# Patient Record
Sex: FEMALE | Race: WHITE | Employment: OTHER | URBAN - METROPOLITAN AREA
[De-identification: names, ages, dates, MRNs, and addresses within clinical notes are randomized per-mention and may not be internally consistent; named-entity substitution may affect disease eponyms.]

---

## 2019-05-03 ENCOUNTER — HOSPITAL ENCOUNTER (EMERGENCY)
Age: 44
Discharge: HOME | End: 2019-05-03
Attending: EMERGENCY MEDICINE

## 2019-05-03 ENCOUNTER — APPOINTMENT (OUTPATIENT)
Dept: GENERAL RADIOLOGY | Age: 44
End: 2019-05-03

## 2019-05-03 VITALS
HEART RATE: 73 BPM | HEIGHT: 67 IN | DIASTOLIC BLOOD PRESSURE: 81 MMHG | OXYGEN SATURATION: 100 % | WEIGHT: 135 LBS | SYSTOLIC BLOOD PRESSURE: 138 MMHG | BODY MASS INDEX: 21.19 KG/M2 | TEMPERATURE: 98.1 F | RESPIRATION RATE: 18 BRPM

## 2019-05-03 DIAGNOSIS — R21 RASH AND OTHER NONSPECIFIC SKIN ERUPTION: Primary | ICD-10-CM

## 2019-05-03 LAB
A/G RATIO: 1.5 (ref 1.1–2.2)
ALBUMIN SERPL-MCNC: 4.4 G/DL (ref 3.4–5)
ALP BLD-CCNC: 45 U/L (ref 40–129)
ALT SERPL-CCNC: 10 U/L (ref 10–40)
ANION GAP SERPL CALCULATED.3IONS-SCNC: 14 MMOL/L (ref 3–16)
AST SERPL-CCNC: 11 U/L (ref 15–37)
BASOPHILS ABSOLUTE: 0 K/UL (ref 0–0.2)
BASOPHILS RELATIVE PERCENT: 0.3 %
BILIRUB SERPL-MCNC: 0.7 MG/DL (ref 0–1)
BUN BLDV-MCNC: 15 MG/DL (ref 7–20)
CALCIUM SERPL-MCNC: 8.9 MG/DL (ref 8.3–10.6)
CHLORIDE BLD-SCNC: 103 MMOL/L (ref 99–110)
CO2: 23 MMOL/L (ref 21–32)
CREAT SERPL-MCNC: 0.6 MG/DL (ref 0.6–1.1)
EKG ATRIAL RATE: 69 BPM
EKG DIAGNOSIS: NORMAL
EKG P AXIS: 27 DEGREES
EKG P-R INTERVAL: 142 MS
EKG Q-T INTERVAL: 390 MS
EKG QRS DURATION: 98 MS
EKG QTC CALCULATION (BAZETT): 417 MS
EKG R AXIS: 45 DEGREES
EKG T AXIS: 51 DEGREES
EKG VENTRICULAR RATE: 69 BPM
EOSINOPHILS ABSOLUTE: 0.1 K/UL (ref 0–0.6)
EOSINOPHILS RELATIVE PERCENT: 1.2 %
GFR AFRICAN AMERICAN: >60
GFR NON-AFRICAN AMERICAN: >60
GLOBULIN: 2.9 G/DL
GLUCOSE BLD-MCNC: 102 MG/DL (ref 70–99)
HCT VFR BLD CALC: 42.3 % (ref 36–48)
HEMOGLOBIN: 14.7 G/DL (ref 12–16)
LYMPHOCYTES ABSOLUTE: 3 K/UL (ref 1–5.1)
LYMPHOCYTES RELATIVE PERCENT: 26.4 %
MCH RBC QN AUTO: 31.5 PG (ref 26–34)
MCHC RBC AUTO-ENTMCNC: 34.8 G/DL (ref 31–36)
MCV RBC AUTO: 90.4 FL (ref 80–100)
MONOCYTES ABSOLUTE: 0.6 K/UL (ref 0–1.3)
MONOCYTES RELATIVE PERCENT: 5.6 %
NEUTROPHILS ABSOLUTE: 7.7 K/UL (ref 1.7–7.7)
NEUTROPHILS RELATIVE PERCENT: 66.5 %
PDW BLD-RTO: 12.9 % (ref 12.4–15.4)
PLATELET # BLD: 117 K/UL (ref 135–450)
PMV BLD AUTO: 12.1 FL (ref 5–10.5)
POTASSIUM SERPL-SCNC: 3.5 MMOL/L (ref 3.5–5.1)
RBC # BLD: 4.68 M/UL (ref 4–5.2)
SODIUM BLD-SCNC: 140 MMOL/L (ref 136–145)
TOTAL PROTEIN: 7.3 G/DL (ref 6.4–8.2)
TROPONIN: <0.01 NG/ML
WBC # BLD: 11.5 K/UL (ref 4–11)

## 2019-05-03 PROCEDURE — 96374 THER/PROPH/DIAG INJ IV PUSH: CPT

## 2019-05-03 PROCEDURE — 85025 COMPLETE CBC W/AUTO DIFF WBC: CPT

## 2019-05-03 PROCEDURE — 6360000002 HC RX W HCPCS: Performed by: EMERGENCY MEDICINE

## 2019-05-03 PROCEDURE — 99285 EMERGENCY DEPT VISIT HI MDM: CPT

## 2019-05-03 PROCEDURE — 93010 ELECTROCARDIOGRAM REPORT: CPT | Performed by: INTERNAL MEDICINE

## 2019-05-03 PROCEDURE — 94640 AIRWAY INHALATION TREATMENT: CPT

## 2019-05-03 PROCEDURE — 71046 X-RAY EXAM CHEST 2 VIEWS: CPT

## 2019-05-03 PROCEDURE — 6370000000 HC RX 637 (ALT 250 FOR IP): Performed by: EMERGENCY MEDICINE

## 2019-05-03 PROCEDURE — 84484 ASSAY OF TROPONIN QUANT: CPT

## 2019-05-03 PROCEDURE — 93005 ELECTROCARDIOGRAM TRACING: CPT | Performed by: EMERGENCY MEDICINE

## 2019-05-03 PROCEDURE — 96375 TX/PRO/DX INJ NEW DRUG ADDON: CPT

## 2019-05-03 PROCEDURE — 2500000003 HC RX 250 WO HCPCS: Performed by: EMERGENCY MEDICINE

## 2019-05-03 PROCEDURE — 80053 COMPREHEN METABOLIC PANEL: CPT

## 2019-05-03 RX ORDER — DIPHENHYDRAMINE HYDROCHLORIDE 50 MG/ML
25 INJECTION INTRAMUSCULAR; INTRAVENOUS ONCE
Status: COMPLETED | OUTPATIENT
Start: 2019-05-03 | End: 2019-05-03

## 2019-05-03 RX ORDER — IPRATROPIUM BROMIDE AND ALBUTEROL SULFATE 2.5; .5 MG/3ML; MG/3ML
2 SOLUTION RESPIRATORY (INHALATION) ONCE
Status: COMPLETED | OUTPATIENT
Start: 2019-05-03 | End: 2019-05-03

## 2019-05-03 RX ORDER — PREDNISONE 10 MG/1
50 TABLET ORAL DAILY
Qty: 25 TABLET | Refills: 0 | Status: SHIPPED | OUTPATIENT
Start: 2019-05-03 | End: 2019-05-08

## 2019-05-03 RX ORDER — DEXAMETHASONE SODIUM PHOSPHATE 4 MG/ML
8 INJECTION, SOLUTION INTRA-ARTICULAR; INTRALESIONAL; INTRAMUSCULAR; INTRAVENOUS; SOFT TISSUE ONCE
Status: COMPLETED | OUTPATIENT
Start: 2019-05-03 | End: 2019-05-03

## 2019-05-03 RX ADMIN — FAMOTIDINE 20 MG: 10 INJECTION, SOLUTION INTRAVENOUS at 03:13

## 2019-05-03 RX ADMIN — IPRATROPIUM BROMIDE AND ALBUTEROL SULFATE 2 AMPULE: .5; 3 SOLUTION RESPIRATORY (INHALATION) at 02:15

## 2019-05-03 RX ADMIN — DIPHENHYDRAMINE HYDROCHLORIDE 25 MG: 50 INJECTION, SOLUTION INTRAMUSCULAR; INTRAVENOUS at 03:13

## 2019-05-03 RX ADMIN — DEXAMETHASONE SODIUM PHOSPHATE 8 MG: 4 INJECTION, SOLUTION INTRAMUSCULAR; INTRAVENOUS at 03:12

## 2019-05-05 ENCOUNTER — HOSPITAL ENCOUNTER (EMERGENCY)
Age: 44
Discharge: HOME | End: 2019-05-05

## 2019-05-05 VITALS
OXYGEN SATURATION: 100 % | SYSTOLIC BLOOD PRESSURE: 130 MMHG | WEIGHT: 130 LBS | RESPIRATION RATE: 18 BRPM | BODY MASS INDEX: 20.4 KG/M2 | HEART RATE: 68 BPM | HEIGHT: 67 IN | DIASTOLIC BLOOD PRESSURE: 87 MMHG | TEMPERATURE: 98.5 F

## 2019-05-05 DIAGNOSIS — F41.1 ANXIETY STATE: Primary | ICD-10-CM

## 2019-05-05 DIAGNOSIS — F15.93 WITHDRAWAL FROM OTHER STIMULANT DRUG (HCC): ICD-10-CM

## 2019-05-05 LAB
A/G RATIO: 1.6 (ref 1.1–2.2)
ALBUMIN SERPL-MCNC: 4.5 G/DL (ref 3.4–5)
ALP BLD-CCNC: 44 U/L (ref 40–129)
ALT SERPL-CCNC: 10 U/L (ref 10–40)
AMORPHOUS: ABNORMAL /HPF
AMPHETAMINE SCREEN, URINE: ABNORMAL
ANION GAP SERPL CALCULATED.3IONS-SCNC: 12 MMOL/L (ref 3–16)
AST SERPL-CCNC: 9 U/L (ref 15–37)
BARBITURATE SCREEN URINE: ABNORMAL
BASOPHILS ABSOLUTE: 0.1 K/UL (ref 0–0.2)
BASOPHILS RELATIVE PERCENT: 0.4 %
BENZODIAZEPINE SCREEN, URINE: POSITIVE
BILIRUB SERPL-MCNC: 0.6 MG/DL (ref 0–1)
BILIRUBIN URINE: ABNORMAL
BLOOD, URINE: ABNORMAL
BUN BLDV-MCNC: 18 MG/DL (ref 7–20)
CALCIUM SERPL-MCNC: 9.1 MG/DL (ref 8.3–10.6)
CANNABINOID SCREEN URINE: POSITIVE
CHLORIDE BLD-SCNC: 102 MMOL/L (ref 99–110)
CLARITY: CLEAR
CO2: 24 MMOL/L (ref 21–32)
COCAINE METABOLITE SCREEN URINE: ABNORMAL
COLOR: YELLOW
CREAT SERPL-MCNC: <0.5 MG/DL (ref 0.6–1.1)
EOSINOPHILS ABSOLUTE: 0 K/UL (ref 0–0.6)
EOSINOPHILS RELATIVE PERCENT: 0.2 %
EPITHELIAL CELLS, UA: ABNORMAL /HPF
GFR AFRICAN AMERICAN: >60
GFR NON-AFRICAN AMERICAN: >60
GLOBULIN: 2.9 G/DL
GLUCOSE BLD-MCNC: 93 MG/DL (ref 70–99)
GLUCOSE URINE: NEGATIVE MG/DL
HCT VFR BLD CALC: 43.6 % (ref 36–48)
HEMOGLOBIN: 15.1 G/DL (ref 12–16)
KETONES, URINE: 15 MG/DL
LEUKOCYTE ESTERASE, URINE: NEGATIVE
LYMPHOCYTES ABSOLUTE: 4.7 K/UL (ref 1–5.1)
LYMPHOCYTES RELATIVE PERCENT: 31.6 %
Lab: ABNORMAL
MCH RBC QN AUTO: 31.3 PG (ref 26–34)
MCHC RBC AUTO-ENTMCNC: 34.7 G/DL (ref 31–36)
MCV RBC AUTO: 90.2 FL (ref 80–100)
METHADONE SCREEN, URINE: ABNORMAL
MICROSCOPIC EXAMINATION: YES
MONOCYTES ABSOLUTE: 0.9 K/UL (ref 0–1.3)
MONOCYTES RELATIVE PERCENT: 6.2 %
MUCUS: ABNORMAL /LPF
NEUTROPHILS ABSOLUTE: 9.2 K/UL (ref 1.7–7.7)
NEUTROPHILS RELATIVE PERCENT: 61.6 %
NITRITE, URINE: NEGATIVE
OPIATE SCREEN URINE: ABNORMAL
OXYCODONE URINE: ABNORMAL
PDW BLD-RTO: 13.3 % (ref 12.4–15.4)
PH UA: 6
PH UA: 6 (ref 5–8)
PHENCYCLIDINE SCREEN URINE: ABNORMAL
PLATELET # BLD: 144 K/UL (ref 135–450)
PMV BLD AUTO: 11.6 FL (ref 5–10.5)
POTASSIUM SERPL-SCNC: 3.8 MMOL/L (ref 3.5–5.1)
PROPOXYPHENE SCREEN: ABNORMAL
PROTEIN UA: ABNORMAL MG/DL
RBC # BLD: 4.84 M/UL (ref 4–5.2)
RBC UA: ABNORMAL /HPF (ref 0–2)
S PYO AG THROAT QL: NEGATIVE
SODIUM BLD-SCNC: 138 MMOL/L (ref 136–145)
SPECIFIC GRAVITY UA: >=1.03 (ref 1–1.03)
TOTAL CK: 43 U/L (ref 26–192)
TOTAL PROTEIN: 7.4 G/DL (ref 6.4–8.2)
URINE REFLEX TO CULTURE: YES
URINE TYPE: ABNORMAL
UROBILINOGEN, URINE: 0.2 E.U./DL
WBC # BLD: 15 K/UL (ref 4–11)
WBC UA: ABNORMAL /HPF (ref 0–5)

## 2019-05-05 PROCEDURE — 82550 ASSAY OF CK (CPK): CPT

## 2019-05-05 PROCEDURE — 80053 COMPREHEN METABOLIC PANEL: CPT

## 2019-05-05 PROCEDURE — 96374 THER/PROPH/DIAG INJ IV PUSH: CPT

## 2019-05-05 PROCEDURE — 80307 DRUG TEST PRSMV CHEM ANLYZR: CPT

## 2019-05-05 PROCEDURE — 6360000002 HC RX W HCPCS: Performed by: NURSE PRACTITIONER

## 2019-05-05 PROCEDURE — 99283 EMERGENCY DEPT VISIT LOW MDM: CPT

## 2019-05-05 PROCEDURE — 87880 STREP A ASSAY W/OPTIC: CPT

## 2019-05-05 PROCEDURE — 96361 HYDRATE IV INFUSION ADD-ON: CPT

## 2019-05-05 PROCEDURE — 87086 URINE CULTURE/COLONY COUNT: CPT

## 2019-05-05 PROCEDURE — 87081 CULTURE SCREEN ONLY: CPT

## 2019-05-05 PROCEDURE — 81001 URINALYSIS AUTO W/SCOPE: CPT

## 2019-05-05 PROCEDURE — 96375 TX/PRO/DX INJ NEW DRUG ADDON: CPT

## 2019-05-05 PROCEDURE — 6370000000 HC RX 637 (ALT 250 FOR IP): Performed by: NURSE PRACTITIONER

## 2019-05-05 PROCEDURE — 85025 COMPLETE CBC W/AUTO DIFF WBC: CPT

## 2019-05-05 PROCEDURE — 2580000003 HC RX 258: Performed by: NURSE PRACTITIONER

## 2019-05-05 RX ORDER — 0.9 % SODIUM CHLORIDE 0.9 %
1000 INTRAVENOUS SOLUTION INTRAVENOUS ONCE
Status: COMPLETED | OUTPATIENT
Start: 2019-05-05 | End: 2019-05-05

## 2019-05-05 RX ORDER — HYDROXYZINE HYDROCHLORIDE 25 MG/1
25 TABLET, FILM COATED ORAL EVERY 6 HOURS PRN
Qty: 20 TABLET | Refills: 0 | Status: SHIPPED | OUTPATIENT
Start: 2019-05-05 | End: 2019-05-15

## 2019-05-05 RX ORDER — DIPHENHYDRAMINE HYDROCHLORIDE 50 MG/ML
25 INJECTION INTRAMUSCULAR; INTRAVENOUS ONCE
Status: COMPLETED | OUTPATIENT
Start: 2019-05-05 | End: 2019-05-05

## 2019-05-05 RX ORDER — HYDROXYZINE PAMOATE 25 MG/1
50 CAPSULE ORAL ONCE
Status: COMPLETED | OUTPATIENT
Start: 2019-05-05 | End: 2019-05-05

## 2019-05-05 RX ORDER — KETOROLAC TROMETHAMINE 30 MG/ML
30 INJECTION, SOLUTION INTRAMUSCULAR; INTRAVENOUS ONCE
Status: COMPLETED | OUTPATIENT
Start: 2019-05-05 | End: 2019-05-05

## 2019-05-05 RX ADMIN — SODIUM CHLORIDE 1000 ML: 9 INJECTION, SOLUTION INTRAVENOUS at 16:29

## 2019-05-05 RX ADMIN — HYDROXYZINE PAMOATE 50 MG: 25 CAPSULE ORAL at 18:46

## 2019-05-05 RX ADMIN — KETOROLAC TROMETHAMINE 30 MG: 30 INJECTION, SOLUTION INTRAMUSCULAR at 16:29

## 2019-05-05 RX ADMIN — DIPHENHYDRAMINE HYDROCHLORIDE 25 MG: 50 INJECTION, SOLUTION INTRAMUSCULAR; INTRAVENOUS at 16:29

## 2019-05-05 ASSESSMENT — PAIN DESCRIPTION - PAIN TYPE: TYPE: ACUTE PAIN

## 2019-05-05 ASSESSMENT — PAIN SCALES - GENERAL
PAINLEVEL_OUTOF10: 3
PAINLEVEL_OUTOF10: 4

## 2019-05-05 ASSESSMENT — ENCOUNTER SYMPTOMS
BACK PAIN: 0
VOMITING: 0
SORE THROAT: 0
COLOR CHANGE: 0
SHORTNESS OF BREATH: 0
COUGH: 0
DIARRHEA: 0
ABDOMINAL PAIN: 0
WHEEZING: 0
NAUSEA: 0

## 2019-05-05 ASSESSMENT — PAIN DESCRIPTION - ORIENTATION: ORIENTATION: LEFT

## 2019-05-05 ASSESSMENT — PAIN DESCRIPTION - LOCATION: LOCATION: SHOULDER

## 2019-05-05 NOTE — ED PROVIDER NOTES
**[facility]  [facility]  EMERGENCY DEPARTMENT ENCOUNTER      Pt Name: Jennie Blair  UNI:[id number]  Mary [place]ategfurt 1975  Date of evaluation: 5/5/2019  Provider: LISY Sanchez CNP      Chief Complaint:    Chief Complaint   Patient presents with    Withdrawal     Pt was seen here on Thursday for same isssue Trying to get off Valium and Norco       Nursing Notes, Past Medical Hx, Past Surgical Hx, Social Hx, Allergies, and Family Hx were all reviewed and agreed with or any disagreements were addressed in the HPI.    HPI:  (Location, Duration, Timing, Severity,Quality, Assoc Sx, Context, Modifying factors)  This is a  39 y.o. female who presents today with needing to get off of her Valium and Norco. She states that she went to follow up with her PCP. Dr. Leland Hough, who was then confronted by the [facility] for over prescribing. She states that the last time she has taken medicine was last Friday, she split her pills in half and her last doses were on 4/26/19. She states that she wants off the meds and feels like she is going through withdrawal. She states her skin feels like it's itching. She states that she has been on the meds for the past 15 years. She was seen in the emergency room just a few days ago for the same symptoms, was started on prednisone. She states since taking the prednisone she feels like her symptoms are only getting worse. She denies any chest pain pleuritic chest pain or shortness of breath. No abdominal pain nausea vomiting or diarrhea. PastMedical/Surgical History:      Diagnosis Date    Pinched nerve     UTI (lower urinary tract infection)      History reviewed. No pertinent surgical history.     Medications:  Discharge Medication List as of 5/5/2019  6:49 PM      CONTINUE these medications which have NOT CHANGED    Details   predniSONE (DELTASONE) 10 MG tablet Take 5 tablets by mouth daily for 5 days, Disp-25 tablet, R-0Print Review of Systems:  Review of Systems   Constitutional: Negative for chills and fever. HENT: Negative for congestion and sore throat. Respiratory: Negative for cough, shortness of breath and wheezing. Cardiovascular: Negative for chest pain. Gastrointestinal: Negative for abdominal pain, diarrhea, nausea and vomiting. Genitourinary: Negative for difficulty urinating and dysuria. Musculoskeletal: Negative for back pain. Skin: Negative for color change. Patient complains of itching all over however denies any rashes or lesions. Neurological: Negative for weakness, numbness and headaches. Psychiatric/Behavioral:        She states she feels very anxious as if she is coming out of her skin, has been feeling a skin is on fire is not itching but no rashes. Positives and Pertinent negatives as per HPI. Except as noted above in the ROS, problem specific ROS was completed and is negative. Physical Exam:  Physical Exam   Constitutional: She is oriented to person, place, and time. She appears well-developed and well-nourished. HENT:   Head: Normocephalic. Right Ear: External ear normal.   Left Ear: External ear normal.   Mouth/Throat: Oropharynx is clear and moist.   Eyes: Right eye exhibits no discharge. Left eye exhibits no discharge. Neck: Normal range of motion. Neck supple. Cardiovascular: Normal rate and normal heart sounds. Pulmonary/Chest: Effort normal and breath sounds normal. No respiratory distress. Abdominal: Soft. Bowel sounds are normal. There is no tenderness. Musculoskeletal: Normal range of motion. Neurological: She is alert and oriented to person, place, and time. GCS eye subscore is 4. GCS verbal subscore is 5. GCS motor subscore is 6. Skin: Skin is warm. She is not diaphoretic. No pallor.    Patient skin is warm dry and actual however she's been scratching all over her chest and arms, there is no visible rashes lesions or break in skin integrity. Psychiatric: She has a normal mood and affect. Her behavior is normal.   The patient appears to be very anxious on exam however she is calm and cooperative with care and easily redirected. She does consent for safety denying any homicidal or suicidal ideations. Nursing note and vitals reviewed.       MEDICAL DECISION MAKING    Vitals:    Vitals:    05/05/19 1637 05/05/19 1728 05/05/19 1841 05/05/19 1911   BP: 130/78 124/79 130/87 130/87   Pulse: 55 63 66 68   Resp: 18 18 18 18   Temp:   98.5 °F (36.9 °C)    TempSrc:   Oral    SpO2: 98% 98% 100%    Weight:       Height:           LABS:  Labs Reviewed   CBC WITH AUTO DIFFERENTIAL - Abnormal; Notable for the following components:       Result Value    WBC 15.0 (*)     MPV 11.6 (*)     Neutrophils # 9.2 (*)     All other components within normal limits    Narrative:     Performed at:  [place] [address] [email or web address]   Phone [phone]   COMPREHENSIVE METABOLIC PANEL - Abnormal; Notable for the following components:    CREATININE <0.5 (*)     AST 9 (*)     All other components within normal limits    Narrative:     Performed at:  [place] [address] [email or web address]   Phone [phone]   URINE RT REFLEX TO CULTURE - Abnormal; Notable for the following components:    Bilirubin Urine SMALL (*)     Ketones, Urine 15 (*)     Blood, Urine LARGE (*)     Protein, UA TRACE (*)     All other components within normal limits    Narrative:     Performed at:  [place] [address] [email or web address]   Phone [phone]   URINE DRUG SCREEN - Abnormal; Notable for the following components:    Benzodiazepine Screen, Urine POSITIVE (*)     Cannabinoid Scrn, Ur POSITIVE (*)     All other components within normal limits    Narrative:     Performed at:  [address] [place] [address]   Phone [phone]   MICROSCOPIC URINALYSIS - Abnormal; Notable for the following components:    Mucus, UA 4+ (*)     WBC, UA 6-10 (*)     RBC, UA  (*)     Amorphous, UA Rare (*)     All other components within normal limits    Narrative:     Performed at:  [address],  [place], [address] [employer]   Phone [phone] A THROAT    Narrative:     Performed at:  [address],  [place], [address] [employer]   Phone [phone]   URINE CULTURE   CK    Narrative:     Performed at:  [address],  [place], [address] [employer]   Phone [phone]   [address] [facility] of labs reviewed and werenegative at this time or not returned at the time of this note. RADIOLOGY:   Non-plain film images such as CT, Ultrasound and MRI are read by the radiologist. Luz ISAACS APRN - CNP have directly visualized the radiologic plain film image(s) with the below findings:        Interpretation per the Radiologist below, if available at the time of thisnote:    No orders to display     [facility] / ED COURSE:      PROCEDURES:   Procedures    None    Patient was given:  Medications   0.9 % sodium chloride bolus (0 mLs Intravenous Stopped 5/5/19 1721)   ketorolac (TORADOL) injection 30 mg (30 mg Intravenous Given 5/5/19 1629)   diphenhydrAMINE (BENADRYL) injection 25 mg (25 mg Intravenous Given 5/5/19 1629)   hydrOXYzine (VISTARIL) capsule 50 mg (50 mg Oral Given 5/5/19 1846)       Patient presents emergency Department with what she believes is withdrawal from being on Norco and Valium for the past 15 years. She states that her PCP was prescribing them unaware that her doctor was in trouble with the [facility].   She states that she's not been on a medication however she was seen in the emergency Department a few days ago and was started on prednisone. She states all of her symptoms of the abdominal cramping nausea and withdrawal have improved however she feels like she is coming out of her skin and she is itchy all over. After evaluation and examination the patient I do believe this is could be related to the prednisone the patient was prescribed. I also believe that the patient currently is not having symptoms of withdrawal however this is more anxiety. I did order IV access, IV fluids, Benadryl and Toradol for her anxiety and itching and burning in her skin. She was concerned about strep throat. CBC shows a slight leukocytosis the like, 15,000 this is most likely related to her prednisone use, no acute anemia. Metabolic panel shows no electrolyte disturbances or renal insufficiency. Liver functions are normal.  CK is normal.  Urinalysis shows no acute infection. Rapid strep is negative. However, her urine drug screen is positive for benzos and marijuana, she does report she is marijuana try to calm down. She has she's been using small tablets of Valium but she states that she was breaking her tablets and pieces. I do believe this is more of a reaction to the prednisone. She was given hydroxyzine and monitored here in the department. She has improvement of symptoms. At this time I estimate there is LOW risk for ANAPHYLAXIS, QUINTANA-LORRI SYNDROME, OR TOXIC EPIDERMAL NECROLYSIS thus I consider the discharge disposition reasonable. Therefore, shared medical decision was made between the patient myself and we agreed she'll be discharged home with outpatient follow-up. She was given PCP referral, educated to call tomorrow for an appointment. She was discharged home with prescriptions for hydroxyzine, educated take medicine as prescribed and return to ER for any worsening symptoms. The patient tolerated their visit well. I evaluated the patient. The physician was available for consultation as needed.   The patient and

## 2019-05-07 LAB
S PYO THROAT QL CULT: NORMAL
URINE CULTURE, ROUTINE: NORMAL

## 2019-05-09 NOTE — ED PROVIDER NOTES
CHIEF COMPLAINT  Shortness of Breath (started on amox and benedryl. States still feels like neck is tight ) and Rash      HISTORY OF PRESENT ILLNESS  Ronnie Fofana is a 39 y.o. female who presents to the ED complaining of feeling like her neck is tight, has rtash on neck and chest. Denies sob or trouble swallowing.  was recently started on amoxicillin and took benadryl tonight for the rash. No other complaints, modifying factors or associated symptoms. I have reviewed the following from the nursing documentation. Past Medical History:   Diagnosis Date    Pinched nerve     UTI (lower urinary tract infection)      History reviewed. No pertinent surgical history. History reviewed. No pertinent family history.   Social History     Socioeconomic History    Marital status:      Spouse name: Not on file    Number of children: Not on file    Years of education: Not on file    Highest education level: Not on file   Occupational History    Not on file   Social Needs    Financial resource strain: Not on file    Food insecurity:     Worry: Not on file     Inability: Not on file    Transportation needs:     Medical: Not on file     Non-medical: Not on file   Tobacco Use    Smoking status: Current Every Day Smoker     Packs/day: 2.00     Types: Cigarettes    Smokeless tobacco: Never Used   Substance and Sexual Activity    Alcohol use: Yes     Comment: occ    Drug use: No    Sexual activity: Not on file   Lifestyle    Physical activity:     Days per week: Not on file     Minutes per session: Not on file    Stress: Not on file   Relationships    Social connections:     Talks on phone: Not on file     Gets together: Not on file     Attends Baptism service: Not on file     Active member of club or organization: Not on file     Attends meetings of clubs or organizations: Not on file     Relationship status: Not on file    Intimate partner violence:     Fear of current or ex partner: Not on file     Emotionally abused: Not on file     Physically abused: Not on file     Forced sexual activity: Not on file   Other Topics Concern    Not on file   Social History Narrative    Not on file     No current facility-administered medications for this encounter. Current Outpatient Medications   Medication Sig Dispense Refill    hydrOXYzine (ATARAX) 25 MG tablet Take 1 tablet by mouth every 6 hours as needed for Itching 20 tablet 0     No Known Allergies    REVIEW OF SYSTEMS  10 systems reviewed, pertinent positives per HPI otherwise noted to be negative. PHYSICAL EXAM  /81   Pulse 73   Temp 98.1 °F (36.7 °C) (Oral)   Resp 18   Ht 5' 7\" (1.702 m)   Wt 135 lb (61.2 kg)   LMP 04/03/2019   SpO2 100%   BMI 21.14 kg/m²   GENERAL APPEARANCE: Awake and alert. Cooperative. No acute distress. HEAD: Normocephalic. Atraumatic. EYES: PERRL. EOM's grossly intact. ENT: Mucous membranes are moist.   NECK: Supple. HEART: RRR. LUNGS: Respirations unlabored. CTAB. Good air exchange. Speaking comfortably in full sentences. BACK: No midline spinal tenderness or step-off. ABDOMEN: Soft. Non-distended. Non-tender. No guarding or rebound. Normal bowel sounds. EXTREMITIES: No peripheral edema. Moves all extremities equally. All extremities neurovascularly intact. SKIN: Warm and dry. Uniform macular Erythematous rash noted on mid upper chest and neck, blanching  NEUROLOGICAL: Alert and oriented. No gross facial drooping. Strength 5/5, sensation intact. Normal coordination. Gait normal.   PSYCHIATRIC: Normal mood and affect. LABS  I have reviewed all labs for this visit.    Results for orders placed or performed during the hospital encounter of 05/03/19   CBC auto differential   Result Value Ref Range    WBC 11.5 (H) 4.0 - 11.0 K/uL    RBC 4.68 4.00 - 5.20 M/uL    Hemoglobin 14.7 12.0 - 16.0 g/dL    Hematocrit 42.3 36.0 - 48.0 %    MCV 90.4 80.0 - 100.0 fL    MCH 31.5 26.0 - 34.0 pg    MCHC 34.8 31.0 - 36.0 g/dL    RDW 12.9 12.4 - 15.4 %    Platelets 911 (L) 902 - 450 K/uL    MPV 12.1 (H) 5.0 - 10.5 fL    Neutrophils % 66.5 %    Lymphocytes % 26.4 %    Monocytes % 5.6 %    Eosinophils % 1.2 %    Basophils % 0.3 %    Neutrophils # 7.7 1.7 - 7.7 K/uL    Lymphocytes # 3.0 1.0 - 5.1 K/uL    Monocytes # 0.6 0.0 - 1.3 K/uL    Eosinophils # 0.1 0.0 - 0.6 K/uL    Basophils # 0.0 0.0 - 0.2 K/uL   Comprehensive metabolic panel   Result Value Ref Range    Sodium 140 136 - 145 mmol/L    Potassium 3.5 3.5 - 5.1 mmol/L    Chloride 103 99 - 110 mmol/L    CO2 23 21 - 32 mmol/L    Anion Gap 14 3 - 16    Glucose 102 (H) 70 - 99 mg/dL    BUN 15 7 - 20 mg/dL    CREATININE 0.6 0.6 - 1.1 mg/dL    GFR Non-African American >60 >60    GFR African American >60 >60    Calcium 8.9 8.3 - 10.6 mg/dL    Total Protein 7.3 6.4 - 8.2 g/dL    Alb 4.4 3.4 - 5.0 g/dL    Albumin/Globulin Ratio 1.5 1.1 - 2.2    Total Bilirubin 0.7 0.0 - 1.0 mg/dL    Alkaline Phosphatase 45 40 - 129 U/L    ALT 10 10 - 40 U/L    AST 11 (L) 15 - 37 U/L    Globulin 2.9 g/dL   Troponin   Result Value Ref Range    Troponin <0.01 <0.01 ng/mL   EKG 12 Lead   Result Value Ref Range    Ventricular Rate 69 BPM    Atrial Rate 69 BPM    P-R Interval 142 ms    QRS Duration 98 ms    Q-T Interval 390 ms    QTc Calculation (Bazett) 417 ms    P Axis 27 degrees    R Axis 45 degrees    T Axis 51 degrees    Diagnosis       Normal sinus rhythmNormal ECGNo previous ECGs availableConfirmed by [facility] ELLEN CHU MD ([id number]) on 5/3/2019 6:25:03 PM     EKG  EKG interpreted by me, NSR, nml intervals and QRS, No significant ST elevation or depression. RADIOLOGY  X-RAYS:  I have reviewed radiologic plain film image(s). ALL OTHER NON-PLAIN FILM IMAGES SUCH AS CT, ULTRASOUND AND MRI HAVE BEEN READ BY THE RADIOLOGIST. XR CHEST STANDARD (2 VW)   Final Result   No acute process. ED COURSE/MDM  Patient seen and evaluated. Stop amoxicillin.  Pt appears to have a non specific erythematous rash on the upper chest and neck. She keeps saying she feels like something Is \"pulling and squeezing\" at her neck and shoulders. She has no swelling of the neck or torso and oropharynx is clear without any swelling or redness or rash. Ears unremarkable. Cardiac w/u here un concerning for acs. She took benadryl at home with minimal relief and here we gave steroids and benadryl and pepcid. Will dc with prednisone and referral for PCP. I do not have concern for acute life threatening illness at this time or anaphylaxis. I estimate there is LOW risk for ACUTE CORONARY SYNDROME, INTRACRANIAL HEMORRHAGE, MALIGNANT DYSRHYTHMIA or HYPERTENSION, PULMONARY EMBOLISM, SEPSIS, SUBARACHNOID HEMORRHAGE, SUBDURAL HEMATOMA, STROKE, or THORACIC AORTIC DISSECTION, thus I consider the discharge disposition reasonable. Ronnie Fofana and I have discussed the diagnosis and risks, and we agree with discharging home to follow-up with their primary doctor. We also discussed returning to the Emergency Department immediately if new or worsening symptoms occur. We have discussed the symptoms which are most concerning (e.g., bloody sputum, fever, worsening pain or shortness of breath, vomiting, weakness) that necessitate immediate return. Discharge Medication List as of 5/3/2019  3:55 AM      START taking these medications    Details   predniSONE (DELTASONE) 10 MG tablet Take 5 tablets by mouth daily for 5 days, Disp-25 tablet, R-0Print           CLINICAL IMPRESSION  1. Rash and other nonspecific skin eruption      Blood pressure 138/81, pulse 73, temperature 98.1 °F (36.7 °C), temperature source Oral, resp. rate 18, height 5' 7\" (1.702 m), weight 135 lb (61.2 kg), last menstrual period 04/03/2019, SpO2 100 %. Wesley Ramos was discharged to home in stable condition.      This chart was generated in part by using Dragon Dictation system and may contain errors related to that system including errors in grammar, punctuation, and spelling, as well as words and phrases that may be inappropriate. When dictating, effort is made to correct spelling/grammar errors. If there are any questions or concerns please feel free to contact the dictating provider for clarification.      [address] [facility],   05/09/19 0152